# Patient Record
(demographics unavailable — no encounter records)

---

## 2024-10-09 NOTE — HEALTH RISK ASSESSMENT
[Good] : ~his/her~  mood as  good [Yes] : Yes [2 - 3 times a week (3 pts)] : 2 - 3  times a week (3 points) [1 or 2 (0 pts)] : 1 or 2 (0 points) [Never (0 pts)] : Never (0 points) [No] : In the past 12 months have you used drugs other than those required for medical reasons? No [No falls in past year] : Patient reported no falls in the past year [Little interest or pleasure doing things] : 1) Little interest or pleasure doing things [Feeling down, depressed, or hopeless] : 2) Feeling down, depressed, or hopeless [0] : 2) Feeling down, depressed, or hopeless: Not at all (0) [PHQ-2 Negative - No further assessment needed] : PHQ-2 Negative - No further assessment needed [Never] : Never [None] : None [With Family] : lives with family [Retired] : retired [] :  [Feels Safe at Home] : Feels safe at home [Fully functional (bathing, dressing, toileting, transferring, walking, feeding)] : Fully functional (bathing, dressing, toileting, transferring, walking, feeding) [Fully functional (using the telephone, shopping, preparing meals, housekeeping, doing laundry, using] : Fully functional and needs no help or supervision to perform IADLs (using the telephone, shopping, preparing meals, housekeeping, doing laundry, using transportation, managing medications and managing finances) [Smoke Detector] : smoke detector [Carbon Monoxide Detector] : carbon monoxide detector [Safety elements used in home] : safety elements used in home [Seat Belt] :  uses seat belt [Sunscreen] : uses sunscreen [[id number]] : 0 [Change in mental status noted] : No change in mental status noted [Reports changes in hearing] : Reports no changes in hearing [Reports changes in vision] : Reports no changes in vision [TB Exposure] : is not being exposed to tuberculosis [Caregiver Concerns] : does not have caregiver concerns

## 2024-10-09 NOTE — PLAN
[FreeTextEntry1] : continue medications chronic medical conditions HLD  Further instructions pending lab results  Follow up with urologist  Flu vaccine administered

## 2024-10-09 NOTE — PHYSICAL EXAM
[No Acute Distress] : no acute distress [Well Nourished] : well nourished [Well Developed] : well developed [Well-Appearing] : well-appearing [Normal Voice/Communication] : normal voice/communication [Normal Sclera/Conjunctiva] : normal sclera/conjunctiva [PERRL] : pupils equal round and reactive to light [EOMI] : extraocular movements intact [Normal Outer Ear/Nose] : the outer ears and nose were normal in appearance [Normal Oropharynx] : the oropharynx was normal [Normal TMs] : both tympanic membranes were normal [No JVD] : no jugular venous distention [No Lymphadenopathy] : no lymphadenopathy [Supple] : supple [Thyroid Normal, No Nodules] : the thyroid was normal and there were no nodules present [No Respiratory Distress] : no respiratory distress  [No Accessory Muscle Use] : no accessory muscle use [Clear to Auscultation] : lungs were clear to auscultation bilaterally [Normal Rate] : normal rate  [Regular Rhythm] : with a regular rhythm [Normal S1, S2] : normal S1 and S2 [No Murmur] : no murmur heard [No Carotid Bruits] : no carotid bruits [No Abdominal Bruit] : a ~M bruit was not heard ~T in the abdomen [No Varicosities] : no varicosities [Pedal Pulses Present] : the pedal pulses are present [No Edema] : there was no peripheral edema [No Palpable Aorta] : no palpable aorta [No Extremity Clubbing/Cyanosis] : no extremity clubbing/cyanosis [Soft] : abdomen soft [Non Tender] : non-tender [Non-distended] : non-distended [No HSM] : no HSM [Normal Bowel Sounds] : normal bowel sounds [Normal Supraclavicular Nodes] : no supraclavicular lymphadenopathy [Normal Posterior Cervical Nodes] : no posterior cervical lymphadenopathy [Normal Anterior Cervical Nodes] : no anterior cervical lymphadenopathy [No CVA Tenderness] : no CVA  tenderness [No Spinal Tenderness] : no spinal tenderness [No Joint Swelling] : no joint swelling [Grossly Normal Strength/Tone] : grossly normal strength/tone [No Rash] : no rash [Coordination Grossly Intact] : coordination grossly intact [No Focal Deficits] : no focal deficits [Normal Gait] : normal gait [Deep Tendon Reflexes (DTR)] : deep tendon reflexes were 2+ and symmetric [Speech Grossly Normal] : speech grossly normal [Memory Grossly Normal] : memory grossly normal [Normal Affect] : the affect was normal [Alert and Oriented x3] : oriented to person, place, and time [Normal Mood] : the mood was normal [Normal Insight/Judgement] : insight and judgment were intact [Declined Rectal Exam] : declined rectal exam [Penis Abnormality] : normal circumcised penis [Scrotum] : the scrotum was normal [Testes Tenderness] : no tenderness of the testes [Testes Mass (___cm)] : there were no testicular masses [Normal Axillary Nodes] : no axillary lymphadenopathy [Normal Inguinal Nodes] : no inguinal lymphadenopathy [Normal Femoral Nodes] : no femoral lymphadenopathy [Normal Appearance] : normal in appearance [No Masses] : no palpable masses [de-identified] : hearing aids

## 2024-10-09 NOTE — HISTORY OF PRESENT ILLNESS
[FreeTextEntry1] : annual physical  [de-identified] : JOHN MACEWEN is a 72 year old M who presents today for his annual physical. Pt has a hx of HLD and prostate cancer. Pt sees a urologist. Pt last had a colonoscopy in 4/23. Pt complains of fatigue and insomnia.

## 2024-10-09 NOTE — END OF VISIT
[FreeTextEntry3] : "I, Miguel Fong, personally scribed the services dictated to me by Dr. Kentrell Nguyen MD in this documentation on 10/09/2024"   "I Dr. Kentrell Nguyen MD, personally performed the services described in this documentation on 10/09/2024 for the patient as scribed by Miguel Fong in my presence. I have reviewed and verified that all the information is accurate and true."

## 2024-10-17 NOTE — ASSESSMENT
[FreeTextEntry1] : Dr. Sarkar reviewed with Saleem his CT scan dated April 19 2024 in office today. CT scan result was stable compared to baseline, with no new nodules and no growing nodules or worsening pulmonary condiiton.   Dr. Sarkar reviewed with Saleem his PFT in office today. PFT was slightly improved compared to baseline.  Advised patient to use AYRE saline spray. Continue Azelastine 2 sprays each nostril twice a day. Repeat chest CT scan for follow-up on history of lung nodule. Return for pulmonary follow up in 4 months.  Dr. Sarkar discussed the importance of and offered the flu shot to SALEEM in office today.  SALEEM declined.

## 2024-10-17 NOTE — ADDENDUM
[FreeTextEntry1] : I, Shona Sealsbambi, acted solely as a scribe for Dr. Teresa Sarkar D.O., on this date 04/10/2024.   All medical record entries made by the Scribe were at my, Dr. Teresa Sarkar D.O., direction and personally dictated by me on 04/10/2024. I have reviewed the chart and agree that the record accurately reflects my personal performance of the history, physical exam, assessment and plan. I have also personally directed, reviewed, and agreed with the chart.

## 2024-10-17 NOTE — ASSESSMENT
[FreeTextEntry1] : Dr. Sarkar reviewed with Saleem his CT scan dated April 19 2024 in office today. CT scan result was stable compared to baseline, with no new nodules and no growing nodules or worsening pulmonary condiiton.   Dr. Sarkar reviewed with Saleem his PFT in office today. PFT was slightly improved compared to baseline.  Advised patient to use AYRE saline spray. Continue Azelastine 2 sprays each nostril twice a day. Repeat chest CT scan for follow-up on history of lung nodule. Return for pulmonary follow up in 4 months.  Dr. Sarkar discussed the importance of and offered the flu shot to SALEEM in office today.  SALEEM declined. no

## 2024-10-17 NOTE — DISCUSSION/SUMMARY
[FreeTextEntry1] : Mr. JOHN MACEWEN is an 72 year old male, history of lung nodule.  Cough secondary to postnasal drip.  Patient appears stable from a pulmonary perspective.

## 2024-10-17 NOTE — PROCEDURE
[FreeTextEntry1] : CT scan dated April 19 2024 revealed a stable 4mm nodule, minimal lung wall thickening, and no new nodules or infiltrates.  ------------ PFT performed today, Oct 16 2024 11:40AM in my office . Spirometry: Suggestive of mild restrictive defect ---------------  Exhaled Nitric Oxide             Final  No Documents Attached    	Test  	Result  	Flag	Reference	Goal	Last Verified  	Exhaled Nitric Oxide	23	 	 		REQUIRED  Ordered by: LISY FELDER       Collected/Examined: [id number] 11:51AM       Verification Required       Stage: Final       Performed at: In Office       Performed by: LISY FELDER       Resulted: 16Oct2024 11:51AM       Last Updated: 16Oct2024 11:51AM

## 2024-10-17 NOTE — PROCEDURE
[FreeTextEntry1] : CT scan dated April 19 2024 revealed a stable 4mm nodule, minimal lung wall thickening, and no new nodules or infiltrates.  ------------ PFT performed today, Oct 16 2024 11:40AM in my office . Spirometry: Suggestive of mild restrictive defect ---------------  Exhaled Nitric Oxide             Final  No Documents Attached    	Test  	Result  	Flag	Reference	Goal	Last Verified  	Exhaled Nitric Oxide	23	 	 		REQUIRED  Ordered by: LISY FELDER       Collected/Examined: [id number] 11:51AM       Verification Required       Stage: Final       Performed at: In Office       Performed by: LISY FELDER       Resulted: 16Oct2024 11:51AM       Last Updated: 16Oct2024 11:51AM

## 2024-10-17 NOTE — HISTORY OF PRESENT ILLNESS
[TextBox_4] : JOHN MACEWEN is a 72 year old male, with history of lung nodule, who presents to the office for follow up evaluation. Patient reports that he is feeling well overall with no respiratory complaints. He states of having symptoms of rhinitis, causing a cough. Patient reports of taking Azelastine nasal spray 2 sprays each nostril twice a day. Saleem already received his flu shot.

## 2024-12-18 NOTE — ASSESSMENT
[Patient Optimized for Surgery] : Patient optimized for surgery [No Further Testing Recommended] : no further testing recommended [Modify medications prior to procedure] : Modify medications prior to procedure [FreeTextEntry4] : Cleared for surgery [FreeTextEntry7] : Hold Multivitamins and Diclofenac Sodium a week prior to procedure

## 2024-12-18 NOTE — HISTORY OF PRESENT ILLNESS
[(Patient denies any chest pain, claudication, dyspnea on exertion, orthopnea, palpitations or syncope)] : Patient denies any chest pain, claudication, dyspnea on exertion, orthopnea, palpitations or syncope [Aortic Stenosis] : no aortic stenosis [Atrial Fibrillation] : no atrial fibrillation [Coronary Artery Disease] : no coronary artery disease [Recent Myocardial Infarction] : no recent myocardial infarction [Implantable Device/Pacemaker] : no implantable device/pacemaker [Asthma] : no asthma [COPD] : no COPD [Sleep Apnea] : no sleep apnea [Smoker] : not a smoker [Family Member] : no family member with adverse anesthesia reaction/sudden death [Self] : no previous adverse anesthesia reaction [Chronic Anticoagulation] : no chronic anticoagulation [Chronic Kidney Disease] : no chronic kidney disease [Diabetes] : no diabetes [FreeTextEntry1] : Right Total Knee Replacement [FreeTextEntry2] : 1/14/2025 [FreeTextEntry3] : Dr. Karlo Fuller  [FreeTextEntry4] : JOHN MACEWEN is a 72-year-old M who presents today for medical clearance. Pt has a hx of HLD, prediabetes and prostate cancer.

## 2024-12-18 NOTE — END OF VISIT
[Time Spent: ___ minutes] : I have spent [unfilled] minutes of time on the encounter which excludes teaching and separately reported services. [FreeTextEntry3] : "I, Miguel Fong, personally scribed the services dictated to me by Dr. Kentrell Nguyen MD in this documentation on 12/18/2024"   "I Dr. Kentrell Nguyen MD, personally performed the services described in this documentation on 12/18/2024 for the patient as scribed by Miguel Fong in my presence. I have reviewed and verified that all the information is accurate and true."

## 2025-03-05 NOTE — SIGNATURES
[TextEntry] : This note was written by Jeffrey Shay on 03/04/2025 acting as medical scribe for Dr. Teresa Sarkar. I, Dr. Teresa Sarkar, have read and attest that all the information, medical decision making and discharge instructions within are true and accurate.

## 2025-03-05 NOTE — PROCEDURE
[FreeTextEntry1] : Pulmonary Function Test performed today, 03/04/2025, in my office: Spirometry: Within normal limits. Lung Volume: Within normal limits. Diffusion: Moderate impairment. ____________________________________________________________ Exhaled Nitric Oxide             Final  No Documents Attached    	Test  	Result  	Flag	Reference	Goal	Last Verified  	Exhaled Nitric Oxide	16	 	 		REQUIRED  Ordered by: LISY FELDER       Collected/Examined: 04Mar2025 02:17PM       Verification Required       Stage: Final       Performed by: TRACEE COX       Resulted: 04Mar2025 02:16PM       Last Updated: 04Mar2025 02:19PM

## 2025-03-05 NOTE — HISTORY OF PRESENT ILLNESS
[TextBox_4] : JOHN MACEWEN is a 73 year old male, with history of lung nodule, who presents to the office for follow up evaluation. Patient reports that he is feeling well overall with no respiratory complaints. He is recently s/p right total knee replacement and is recovering well; is being physically active.

## 2025-03-05 NOTE — ASSESSMENT
[FreeTextEntry1] : Obtained and reviewed pulmonary function test and NiOx results with patient today. PFT and NiOx were both unremarkable.  Dr. Sarkar's recommendation: Neo should undergo a repeat chest CT scan for follow up on his lung nodules. Return for follow up in 1 month with review of the chest CT results.  Continue Flonase nasal spray for postnasal drip.

## 2025-03-28 NOTE — PROCEDURE
[FreeTextEntry1] : Xray Chest 2 Views PA/Lat             Final  No Documents Attached    	 Chest x-ray PA and lateral views performed in my office today showed clear lungs, no evidence of infiltrates or pleural effusions.  Ordered by: LISY FELDER       Collected/Examined: 26Mar2025 03:32PM       Verification Required       Stage: Final       Performed at: In Office       Performed by: DARIA PONCE       Resulted: 26Mar2025 03:32PM       Last Updated: 26Mar2025 03:32PM

## 2025-03-28 NOTE — REASON FOR VISIT
[Follow-Up] : a follow-up visit [Cough] : cough [Pulmonary Nodules] : pulmonary nodules [TextBox_44] : got sick 3 weeks agp

## 2025-03-28 NOTE — DISCUSSION/SUMMARY
[FreeTextEntry1] : Mr. JOHN MACEWEN is an 72 year old male, history of lung nodule.  Cough secondary to acute asthmatic bronchitis.

## 2025-03-28 NOTE — ASSESSMENT
[FreeTextEntry1] : Chest x-ray PA and lateral views performed in my office today showed s/p PPM, clear lungs, no evidence of infiltrates or pleural effusions.  Dr. Sarkar's assessment: Neo's productive cough is likely secondary to bronchitis.   Dr. Sarkar's recommendation: At this point, Neo should start taking Z-carola and Prednisone taper for his cough/inflammation/acute asthmatic bronchitis. Return for pulmonary follow up in 3 months.

## 2025-03-28 NOTE — HISTORY OF PRESENT ILLNESS
[Never] : never [TextBox_4] : JOHN MACEWEN is a 73-year-old male, with history of lung nodule, who presents to the office for follow up evaluation. Patient reports that he is feeling well overall with no respiratory complaints. He is Jan 2025 s/p right total knee replacement and is recovering well; is being physically active.  Had recent CT chest stale nodules on March 11th  soon after that developed low grade fever and chest congestion had moderate mucus slight color can't sleep at night because of coughing using Robitussin only no wheeze or sob using astlin nasal spray

## 2025-05-01 NOTE — DISEASE MANAGEMENT
[1] : T1 [c] : c [0] : N0 [X] : MX [0-10] : 0 -10 ng/mL [Biopsy with Fusion] : Patient had a biopsy with fusion on [7(3+4)] : Template Biopsy Beauty Score: 7(3+4) [Biopsy results sent to PCP/Referring Physician] : Biopsy results sent to PCP/Referring Physician [4] : 4 [IIB] : IIB [Radiation Therapy] : Radiation Therapy [Treatment with radiation therapy] : Treatment with radiation therapy [EBRT] : EBRT [] : Patient had no Bone Scan performed [BiopsyDate] : 2/24/2021 [MeasuredProstateVolume] : 35 mL [TotalCores] : 15 [TotalPositiveCores] : 2 [MaxCoreInvolvement] : 50% [FreeTextEntry7] : PSA at diagnosis 5.3 ng/mL, 11/09/2020\par  MRI Jan 2021: 3 lesions, PIRAD 4.\par  Targeted biopsy cores were negative. [RadiationCompletedDate] : 6/2/2021 [EBRTDose] : 40 Gy [EBRTFractions] : 5

## 2025-05-01 NOTE — HISTORY OF PRESENT ILLNESS
[FreeTextEntry1] : Patient is a 73-year-old man comes in today for annual prostate cancer follow up.  History of [place] 7 (3+4) prostate cancer S/P SBRT 2021  Remains on Alfuzosin 10 mg once daily.  PSA 0.25 ng/mL, 4/21/2025  Urinary function. Frequency. Denies urgency, hematuria or dysuria. Denies any bothersome urinary symptoms.   ED: currently on tadalafil 5mg daily with 20 mg prn.

## 2025-05-01 NOTE — HISTORY OF PRESENT ILLNESS
[FreeTextEntry1] : Patient is a 73-year-old man comes in today for annual prostate cancer follow up.  History of [place] 7 (3+4) prostate cancer S/P SBRT 2021  Remains on Alfuzosin 10 mg once daily.  PSA 0.25 ng/mL, 4/21/2025  Urinary function. Frequency. Denies urgency, hematuria or dysuria. Denies any bothersome urinary symptoms.   ED: currently on tadalafil 5mg daily with 20 mg prn.

## 2025-05-01 NOTE — DISEASE MANAGEMENT
[1] : T1 [c] : c [0] : N0 [X] : MX [0-10] : 0 -10 ng/mL [Biopsy with Fusion] : Patient had a biopsy with fusion on [7(3+4)] : Template Biopsy [place] Score: 7(3+4) [Biopsy results sent to PCP/Referring Physician] : Biopsy results sent to PCP/Referring Physician [4] : 4 [IIB] : IIB [Radiation Therapy] : Radiation Therapy [Treatment with radiation therapy] : Treatment with radiation therapy [EBRT] : EBRT [] : Patient had no Bone Scan performed [BiopsyDate] : 2/24/2021 [MeasuredProstateVolume] : 35 mL [TotalCores] : 15 [TotalPositiveCores] : 2 [MaxCoreInvolvement] : 50% [FreeTextEntry7] : PSA at diagnosis 5.3 ng/mL, 11/09/2020\par  MRI Jan 2021: 3 lesions, PIRAD 4.\par  Targeted biopsy cores were negative. [RadiationCompletedDate] : 6/2/2021 [EBRTDose] : 40 Gy [EBRTFractions] : 5

## 2025-07-16 NOTE — SIGNATURES
[TextEntry] : This note was written by Bert Pittman on 07/16/2025 acting as medical scribe for Dr. Teresa Sarkar. I, Dr. Teresa Sarkar, have read and attest that all the information, medical decision making and discharge instructions within are true and accurate.

## 2025-07-16 NOTE — ASSESSMENT
[FreeTextEntry1] : Obtained and reviewed pulmonary function test and NiOx results with patient today.  PFT was stable compared to baseline and NiOx was unremarkable.   Repeat chest CT scan for follow-up by history of stable lung nodules in 8 months.  Return for pulmonary follow-up in 4 months.

## 2025-07-16 NOTE — HISTORY OF PRESENT ILLNESS
[TextBox_4] : JOHN MACEWEN is a 73 year-old male who presents to the office today for pulmonary follow up visit. The patient reports to be feeling reasonably well at this time with no respiratory complaints. He reports no SOB.

## 2025-07-16 NOTE — PROCEDURE
[FreeTextEntry1] : Pulmonary Function Test performed today, 07/16/2025, in my office:  Pulmonary Function Test performed in my office today: Spirometry: Within normal limits; Lung Volume: Within normal limits; Diffusion: Within normal limits. --------------------  Exhaled Nitric Oxide             Final  No Documents Attached    	Test  	Result  	Flag	Reference	Goal	Last Verified  	Exhaled Nitric Oxide	22	 	 		REQUIRED  Ordered by: LISY FELDER       Collected/Examined: 16-Jul-2025 11:57 am       Verification Required       Stage: Final       Performed at: In Office       Performed by: PINKY GROSSMAN       Resulted: 16-Jul-2025 11:57 am       Last Updated: 16-Jul-2025 11:57 am